# Patient Record
Sex: FEMALE | Race: BLACK OR AFRICAN AMERICAN
[De-identification: names, ages, dates, MRNs, and addresses within clinical notes are randomized per-mention and may not be internally consistent; named-entity substitution may affect disease eponyms.]

---

## 2019-07-09 ENCOUNTER — HOSPITAL ENCOUNTER (EMERGENCY)
Dept: HOSPITAL 54 - ER | Age: 44
Discharge: HOME | End: 2019-07-09
Payer: COMMERCIAL

## 2019-07-09 VITALS — BODY MASS INDEX: 39.39 KG/M2 | WEIGHT: 251 LBS | HEIGHT: 67 IN

## 2019-07-09 VITALS — SYSTOLIC BLOOD PRESSURE: 173 MMHG | DIASTOLIC BLOOD PRESSURE: 89 MMHG

## 2019-07-09 DIAGNOSIS — I10: Primary | ICD-10-CM

## 2019-07-09 DIAGNOSIS — J45.909: ICD-10-CM

## 2019-07-09 PROCEDURE — Z7502: HCPCS

## 2019-12-17 ENCOUNTER — HOSPITAL ENCOUNTER (EMERGENCY)
Dept: HOSPITAL 54 - ER | Age: 44
Discharge: HOME | End: 2019-12-17
Payer: COMMERCIAL

## 2019-12-17 VITALS — DIASTOLIC BLOOD PRESSURE: 108 MMHG | SYSTOLIC BLOOD PRESSURE: 156 MMHG

## 2019-12-17 VITALS — WEIGHT: 254 LBS | BODY MASS INDEX: 39.87 KG/M2 | HEIGHT: 67 IN

## 2019-12-17 DIAGNOSIS — J36: Primary | ICD-10-CM

## 2019-12-17 DIAGNOSIS — J45.901: ICD-10-CM

## 2019-12-17 DIAGNOSIS — Z79.899: ICD-10-CM

## 2019-12-17 DIAGNOSIS — I16.0: ICD-10-CM

## 2019-12-17 DIAGNOSIS — I10: ICD-10-CM

## 2019-12-17 PROCEDURE — 87880 STREP A ASSAY W/OPTIC: CPT

## 2019-12-17 PROCEDURE — 99284 EMERGENCY DEPT VISIT MOD MDM: CPT

## 2019-12-17 PROCEDURE — 87070 CULTURE OTHR SPECIMN AEROBIC: CPT

## 2019-12-17 PROCEDURE — 94640 AIRWAY INHALATION TREATMENT: CPT

## 2019-12-17 PROCEDURE — 93005 ELECTROCARDIOGRAM TRACING: CPT

## 2019-12-17 NOTE — NUR
PATIENT TO ER BED 9 BIB SELF C/O LUMP ON LEFT SIDE OF NECK. PATIENT STATES THAT 
SHE NOTICED IT TODAY AND IT MADE IT DIFFICULT TO BREATHE. PAIN IS NOTED AS 
WELL. NO FEVER. PATIENT ENDORSES CONGESTION. EXPIRATORY WHEEZING IS HEARD 
BILATERALLY. AAOX4. NO SOB. BREATHING EVENLY AND UNLABORED. CONNECTED TO 
MONITOR.

## 2019-12-17 NOTE — NUR
Patient discharged to home in stable condition. Written and verbal after care 
instructions given. Patient verbalizes understanding of instruction and RX. PT 
ambulatory with a steady gait. Pt breathing clearly. VSS.